# Patient Record
(demographics unavailable — no encounter records)

---

## 2024-11-15 NOTE — BEGINNING OF VISIT
[0] : 2) Feeling down, depressed, or hopeless: Not at all (0) [PHQ-2 Negative] : PHQ-2 Negative [Pain Scale: ___] : On a scale of 1-10, today the patient's pain is a(n) [unfilled]. [Former] : Former [5-9] : 5-9 [Reviewed, no changes] : Reviewed, no changes [FreeTextEntry7] : na

## 2024-11-15 NOTE — HISTORY OF PRESENT ILLNESS
[Disease: _____________________] : Disease: [unfilled] [AJCC Stage: ____] : AJCC Stage: [unfilled] [de-identified] : Ms. Lopes is a postmenopausal 62 year old  female, referred from Dr. Dykes for newly diagnosed uterine cancer. She presented to Dr. Bolden office, with pelvic pain for over 5 months. She has been taking 800-1000 mg of ibuprofen at a time, for pain, multiple times a day. She admits to not having GYN or Medical care in many years. A CT scan was done by her PCP, and routine blood work was ordered. \par  \par  18- CT AP without IV contrast-\par   Lung bases- numerous punctate calcified granulomas peripherally\par   Liver- Hepatic cyst\par   Spleen/ Pancreas/ Gallbladder- unremarkable\par   Adrenal gland thickening tiny nodularity\par   Kidneys- Two 1 mm renal calculi\par   Lymphadenopathy- 10 x 19 mm left periaortic lymph nodes. Additional prominent retroperitoneal lymph nodes\par   Pelvic Viscera- Grossly enlarged uterus, coarse calcifications in left frontal region and exophytic soft tissue in the right fundal region, suggesting underlying myomas. Suggestion of large amount of fluid endometrial cavity\par   Pelvic Lymph Nodes- 16 mm left external iliac chain lymph node. 15 mm right common iliac chain lymph node. Multiple additional smaller iliac chain lymph nodes\par   Small fat containing umbilical hernia \par  \par  At this time she was referred to GYN/ONC and was started on Fe and Percocet.  \par  She is a current 20 pack year smoker. Today she is feeling fatigue. She still reports ongoing VB. She denies change in urinary habits. She has constipation, but she relates this to the Fe supplementation. She has recent weight loss, but denies a change in appetite. She denies any other associated signs or symptoms.\par  \par  On 3/15/18 she had surgical staging, and s status post TRA, BSO, LNS, Omentectomy.\par  \par  Final pathology showed -Uterus, cervix, bilateral adnexa, TRA-BSO - De-differentiated endometrial primary carcinoma , see synoptic summary and comment - Bilateral adnexa, negative for tumor - Left fallopian tube shows severe acute and chronic inflammation - Cervix, positive for tumor  2-Colon mesentery nodules - Benign fibroconnective tissue with acute and chronic inflammation  3-Left pelvic lymph node package -  Two reactive lymph nodes  4-Left common iliac lymph nodes - One reactive lymph node  5-Left trayn-aortic lymph nodes - One reactive lymph node  6-Left common illiac bifurcation lymph nodes -One reactive lymph node  7-Right pelvic lymph nodes package -One reactive lymph node  8-Right common illiac nodes package -One reactive lymph node  9-Right common illiac and para caval nodes  package - Two  reactive lymph node  10. Paraaortic lymph node package -One reactive lymph node  11-Omental  biopsy - Benign adipose tissue  Comment : Tumor is mostly a high grade carcinoma with morphologic features of an un-differentiated carcinoma. Tumor.\par   7    Surgical Final Repor t     very extensively sampled, sarcomatous component is not present. Tumor is associated with a minor better differentiated component with focal clear cell features , therefore the final diagnosis is most consistent with a de-differentiated endometrial primary carcinoma. Of note, the immunoprofile of the tumor shows diffuse positivity for ER , AE1-AE3 and EMA , negative staining for napsin. Tumor shows p53 wild type pattern of staining and is focally positive for p16.   [de-identified] : Siomara is here for a follow up visit. She is stressed due to her brothers health situations. She is due for mammogram .

## 2024-11-15 NOTE — DISCUSSION/SUMMARY
[Reviewed Clinical Lab Test(s)] : Results of clinical tests were reviewed. [Reviewed Radiology Report(s)] : Radiology reports were reviewed. [FreeTextEntry1] : She is MALIA now off therapy.  -We discussed her care plan and the planned surveillance.    -We discussed BHM. BSE qm. CBE today by MADONNA. Await f/u imaging.  -Her instructions were reviewed, incl for the hernias, which remains asx. Previously, provided her with the contact for surg (SS).  -All Q/A. -She'll RTO in 6m at the [facility] [place].  -Effort for the visit includes the note prep, review of prior material, interview, exam, further documentation, and coordination of care.

## 2024-11-15 NOTE — HISTORY OF PRESENT ILLNESS
[Disease: _____________________] : Disease: [unfilled] [AJCC Stage: ____] : AJCC Stage: [unfilled] [de-identified] : Ms. Lopes is a postmenopausal 62 year old  female, referred from Dr. Dykes for newly diagnosed uterine cancer. She presented to Dr. Bolden office, with pelvic pain for over 5 months. She has been taking 800-1000 mg of ibuprofen at a time, for pain, multiple times a day. She admits to not having GYN or Medical care in many years. A CT scan was done by her PCP, and routine blood work was ordered. \par  \par  18- CT AP without IV contrast-\par   Lung bases- numerous punctate calcified granulomas peripherally\par   Liver- Hepatic cyst\par   Spleen/ Pancreas/ Gallbladder- unremarkable\par   Adrenal gland thickening tiny nodularity\par   Kidneys- Two 1 mm renal calculi\par   Lymphadenopathy- 10 x 19 mm left periaortic lymph nodes. Additional prominent retroperitoneal lymph nodes\par   Pelvic Viscera- Grossly enlarged uterus, coarse calcifications in left frontal region and exophytic soft tissue in the right fundal region, suggesting underlying myomas. Suggestion of large amount of fluid endometrial cavity\par   Pelvic Lymph Nodes- 16 mm left external iliac chain lymph node. 15 mm right common iliac chain lymph node. Multiple additional smaller iliac chain lymph nodes\par   Small fat containing umbilical hernia \par  \par  At this time she was referred to GYN/ONC and was started on Fe and Percocet.  \par  She is a current 20 pack year smoker. Today she is feeling fatigue. She still reports ongoing VB. She denies change in urinary habits. She has constipation, but she relates this to the Fe supplementation. She has recent weight loss, but denies a change in appetite. She denies any other associated signs or symptoms.\par  \par  On 3/15/18 she had surgical staging, and s status post TRA, BSO, LNS, Omentectomy.\par  \par  Final pathology showed -Uterus, cervix, bilateral adnexa, TRA-BSO - De-differentiated endometrial primary carcinoma , see synoptic summary and comment - Bilateral adnexa, negative for tumor - Left fallopian tube shows severe acute and chronic inflammation - Cervix, positive for tumor  2-Colon mesentery nodules - Benign fibroconnective tissue with acute and chronic inflammation  3-Left pelvic lymph node package -  Two reactive lymph nodes  4-Left common iliac lymph nodes - One reactive lymph node  5-Left taryn-aortic lymph nodes - One reactive lymph node  6-Left common illiac bifurcation lymph nodes -One reactive lymph node  7-Right pelvic lymph nodes package -One reactive lymph node  8-Right common illiac nodes package -One reactive lymph node  9-Right common illiac and para caval nodes  package - Two  reactive lymph node  10. Paraaortic lymph node package -One reactive lymph node  11-Omental  biopsy - Benign adipose tissue  Comment : Tumor is mostly a high grade carcinoma with morphologic features of an un-differentiated carcinoma. Tumor.\par   7    Surgical Final Repor t     very extensively sampled, sarcomatous component is not present. Tumor is associated with a minor better differentiated component with focal clear cell features , therefore the final diagnosis is most consistent with a de-differentiated endometrial primary carcinoma. Of note, the immunoprofile of the tumor shows diffuse positivity for ER , AE1-AE3 and EMA , negative staining for napsin. Tumor shows p53 wild type pattern of staining and is focally positive for p16.   [de-identified] : Siomara is here for a follow up visit. She is stressed due to her brothers health situations. She is due for mammogram .

## 2024-11-15 NOTE — HISTORY OF PRESENT ILLNESS
[FreeTextEntry1] : Stage II De-differentiated Endometrial Adenocarcinoma TRA, BSO, LNS - Apr 17, 2018 Adj CT Pelvic RT - completed Sept 2018 PCP: Per pt, looking for a new doc (was Dr Bolden) GI: Dr Avelar Rad Onc: Dr Rapp Med Onc: Dr Jeffers  Current Treatment Status: Treatment Protocol . Carboplatin and Taxol C1- 4/26/18 C2- 5/17/18 C3 -6/7/18 EBRT (9/13/18 - end date) C4 - 10/5/18 C5 - 10/26/18 C6 - 11/16/18  She returns feeling well and noting no VB, VD, or pain.  No sx from the hernia. No reported GI or  sx. Lost a brother; 4 others have been sick, ? reasons she doesn't seem to know.   CT: Aug 2020 - MALIA, Hernias.   MARKERS: Values reviewed in RESULTS.    CCS: Feb 2018 BHM: CBE by MADONNA today (11/15/24) per Dr Jeffers. Mgm/Sono Aug 2022 - BIRADS 1; we disc her need for imaging.  DEXA: Mar 2023 - OP.  Saw Ky.